# Patient Record
Sex: FEMALE | Race: WHITE | NOT HISPANIC OR LATINO | URBAN - METROPOLITAN AREA
[De-identification: names, ages, dates, MRNs, and addresses within clinical notes are randomized per-mention and may not be internally consistent; named-entity substitution may affect disease eponyms.]

---

## 2020-12-03 ENCOUNTER — WEB ENCOUNTER (OUTPATIENT)
Dept: URBAN - METROPOLITAN AREA CLINIC 94 | Facility: CLINIC | Age: 20
End: 2020-12-03

## 2020-12-03 ENCOUNTER — OFFICE VISIT (OUTPATIENT)
Dept: URBAN - METROPOLITAN AREA CLINIC 94 | Facility: CLINIC | Age: 20
End: 2020-12-03
Payer: SELF-PAY

## 2020-12-03 DIAGNOSIS — R11.2 NAUSEA AND VOMITING: ICD-10-CM

## 2020-12-03 PROCEDURE — G8427 DOCREV CUR MEDS BY ELIG CLIN: HCPCS | Performed by: INTERNAL MEDICINE

## 2020-12-03 PROCEDURE — 99204 OFFICE O/P NEW MOD 45 MIN: CPT | Performed by: INTERNAL MEDICINE

## 2020-12-03 PROCEDURE — G8420 CALC BMI NORM PARAMETERS: HCPCS | Performed by: INTERNAL MEDICINE

## 2020-12-03 PROCEDURE — G9903 PT SCRN TBCO ID AS NON USER: HCPCS | Performed by: INTERNAL MEDICINE

## 2020-12-03 RX ORDER — NORETHINDRONE ACETATE AND ETHINYL ESTRADIOL, ETHINYL ESTRADIOL AND FERROUS FUMARATE 1MG-10(24)
KIT ORAL
Qty: 84 DELAYED RELEASE TABLET | Status: ACTIVE | COMMUNITY

## 2020-12-03 RX ORDER — ONDANSETRON 4 MG/1
TABLET, FILM COATED ORAL
Qty: 12 DELAYED RELEASE TABLET | Status: ACTIVE | COMMUNITY

## 2020-12-03 RX ORDER — AMITRIPTYLINE HYDROCHLORIDE 25 MG/1
1 TABLET AT BEDTIME TABLET, FILM COATED ORAL ONCE A DAY
Qty: 30 TABLET | Refills: 6 | OUTPATIENT

## 2020-12-03 RX ORDER — OMEPRAZOLE 20 MG/1
1 CAPSULE 30 MINUTES BEFORE MORNING MEAL CAPSULE, DELAYED RELEASE ORAL ONCE A DAY
Status: ACTIVE | COMMUNITY

## 2020-12-03 RX ORDER — SUCRALFATE 1 G/1
1 TABLET ON AN EMPTY STOMACH TABLET ORAL
Status: ACTIVE | COMMUNITY

## 2020-12-03 RX ORDER — DICYCLOMINE HYDROCHLORIDE 20 MG/1
TABLET ORAL
Qty: 20 DELAYED RELEASE TABLET | Status: ACTIVE | COMMUNITY

## 2020-12-03 NOTE — HPI-TODAY'S VISIT:
Pt presents for evaluation of chronic nausea, vomiting and abdominal pain. Symptoms present for 1 year. Pt has intermittent episodes of severe nausea with vomiting, once every few months. She throws up to extent that she gets dehydrated and has to go to ER. She gets soreness in abdominal mucles due to vomiting. Inbetween she is fine with no GI issues. No GERD, dyspepsia or weight loss. Pt suffers from migraine headaches. Denies marijuana use. Pt recently evaluated at [facility] [place] with severe N/V. CBC and CMP WNL. CT A/P essesntially negative. Pt started on prilosec, carafate, bentyl and zofran. No episodes since then.

## 2021-01-13 ENCOUNTER — CLAIMS CREATED FROM THE CLAIM WINDOW (OUTPATIENT)
Dept: URBAN - METROPOLITAN AREA CLINIC 4 | Facility: CLINIC | Age: 21
End: 2021-01-13
Payer: SELF-PAY

## 2021-01-13 ENCOUNTER — OFFICE VISIT (OUTPATIENT)
Dept: URBAN - METROPOLITAN AREA SURGERY CENTER 17 | Facility: SURGERY CENTER | Age: 21
End: 2021-01-13
Payer: SELF-PAY

## 2021-01-13 DIAGNOSIS — R11.2 ACUTE NAUSEA WITH NONBILIOUS VOMITING: ICD-10-CM

## 2021-01-13 DIAGNOSIS — R10.13 ABDOMINAL DISCOMFORT, EPIGASTRIC: ICD-10-CM

## 2021-01-13 DIAGNOSIS — K31.89 ACQUIRED DEFORMITY OF DUODENUM: ICD-10-CM

## 2021-01-13 DIAGNOSIS — K31.89 OTHER DISEASES OF STOMACH AND DUODENUM: ICD-10-CM

## 2021-01-13 PROCEDURE — 88342 IMHCHEM/IMCYTCHM 1ST ANTB: CPT | Performed by: PATHOLOGY

## 2021-01-13 PROCEDURE — 88312 SPECIAL STAINS GROUP 1: CPT | Performed by: PATHOLOGY

## 2021-01-13 PROCEDURE — G8907 PT DOC NO EVENTS ON DISCHARG: HCPCS | Performed by: INTERNAL MEDICINE

## 2021-01-13 PROCEDURE — 88305 TISSUE EXAM BY PATHOLOGIST: CPT | Performed by: PATHOLOGY

## 2021-01-13 PROCEDURE — 43239 EGD BIOPSY SINGLE/MULTIPLE: CPT | Performed by: INTERNAL MEDICINE

## 2021-01-13 RX ORDER — OMEPRAZOLE 20 MG/1
1 CAPSULE 30 MINUTES BEFORE MORNING MEAL CAPSULE, DELAYED RELEASE ORAL ONCE A DAY
Status: ACTIVE | COMMUNITY

## 2021-01-13 RX ORDER — SUCRALFATE 1 G/1
1 TABLET ON AN EMPTY STOMACH TABLET ORAL
Status: ACTIVE | COMMUNITY

## 2021-01-13 RX ORDER — AMITRIPTYLINE HYDROCHLORIDE 25 MG/1
1 TABLET AT BEDTIME TABLET, FILM COATED ORAL ONCE A DAY
Qty: 30 TABLET | Refills: 6 | Status: ACTIVE | COMMUNITY

## 2021-01-13 RX ORDER — NORETHINDRONE ACETATE AND ETHINYL ESTRADIOL, ETHINYL ESTRADIOL AND FERROUS FUMARATE 1MG-10(24)
KIT ORAL
Qty: 84 DELAYED RELEASE TABLET | Status: ACTIVE | COMMUNITY

## 2021-01-13 RX ORDER — ONDANSETRON 4 MG/1
TABLET, FILM COATED ORAL
Qty: 12 DELAYED RELEASE TABLET | Status: ACTIVE | COMMUNITY

## 2021-01-13 RX ORDER — DICYCLOMINE HYDROCHLORIDE 20 MG/1
TABLET ORAL
Qty: 20 DELAYED RELEASE TABLET | Status: ACTIVE | COMMUNITY

## 2021-07-20 ENCOUNTER — OFFICE VISIT (OUTPATIENT)
Dept: URBAN - METROPOLITAN AREA CLINIC 94 | Facility: CLINIC | Age: 21
End: 2021-07-20
Payer: SELF-PAY

## 2021-07-20 VITALS
WEIGHT: 104 LBS | HEIGHT: 61 IN | HEART RATE: 84 BPM | SYSTOLIC BLOOD PRESSURE: 120 MMHG | TEMPERATURE: 98.1 F | DIASTOLIC BLOOD PRESSURE: 72 MMHG | BODY MASS INDEX: 19.63 KG/M2

## 2021-07-20 DIAGNOSIS — R11.14 BILIOUS VOMITING WITH NAUSEA: ICD-10-CM

## 2021-07-20 DIAGNOSIS — R10.13 EPIGASTRIC PAIN: ICD-10-CM

## 2021-07-20 DIAGNOSIS — R16.0 HEPATOMEGALY: ICD-10-CM

## 2021-07-20 PROCEDURE — 99213 OFFICE O/P EST LOW 20 MIN: CPT | Performed by: INTERNAL MEDICINE

## 2021-07-20 RX ORDER — OMEPRAZOLE 20 MG/1
1 CAPSULE 30 MINUTES BEFORE MORNING MEAL CAPSULE, DELAYED RELEASE ORAL ONCE A DAY
Status: ON HOLD | COMMUNITY

## 2021-07-20 RX ORDER — NORETHINDRONE ACETATE AND ETHINYL ESTRADIOL, ETHINYL ESTRADIOL AND FERROUS FUMARATE 1MG-10(24)
KIT ORAL
Qty: 84 DELAYED RELEASE TABLET | Status: ACTIVE | COMMUNITY

## 2021-07-20 RX ORDER — AMITRIPTYLINE HYDROCHLORIDE 25 MG/1
1 TABLET AT BEDTIME TABLET, FILM COATED ORAL ONCE A DAY
Qty: 30 TABLET | Refills: 6

## 2021-07-20 RX ORDER — SUCRALFATE 1 G/1
1 TABLET ON AN EMPTY STOMACH TABLET ORAL
Status: ON HOLD | COMMUNITY

## 2021-07-20 RX ORDER — AMITRIPTYLINE HYDROCHLORIDE 25 MG/1
1 TABLET AT BEDTIME TABLET, FILM COATED ORAL ONCE A DAY
Qty: 30 TABLET | Refills: 6 | Status: ACTIVE | COMMUNITY

## 2021-07-20 RX ORDER — ONDANSETRON 4 MG/1
TABLET, FILM COATED ORAL
Qty: 12 DELAYED RELEASE TABLET | Status: ACTIVE | COMMUNITY

## 2021-07-20 RX ORDER — DICYCLOMINE HYDROCHLORIDE 20 MG/1
TABLET ORAL
Qty: 20 DELAYED RELEASE TABLET | Status: ON HOLD | COMMUNITY

## 2021-07-20 NOTE — PHYSICAL EXAM CONSTITUTIONAL:
well developed, well nourished, thin, in no acute distress , ambulating without difficulty , normal communication ability

## 2021-07-20 NOTE — HPI-TODAY'S VISIT:
Ms. Sequeira presents today in follow up from ED visit 6/29 with complaints of abdominal pain and vomiting.  CT performed reveals a slightly enlarged liver (20cm)and periportal edema.  Liver enzymes in normal range.  She reports today that she is no longer taking dicyclomine, omeprazole or sucralfate.  She states that she has been taking amitriptyline as prescribed and as long as she does this, she has no further symptoms.  She reports that she realized stress and anxiety was a strong contributing factors to her symptoms.  She states she has also stopped vaping which as also helped.   EGD was last performed in January with normal findings.  She has since gained 9 pounds. She takes an occasional Zofran if needed.

## 2021-07-21 ENCOUNTER — WEB ENCOUNTER (OUTPATIENT)
Dept: URBAN - METROPOLITAN AREA CLINIC 94 | Facility: CLINIC | Age: 21
End: 2021-07-21

## 2021-07-21 LAB
HBSAG SCREEN: NEGATIVE
HEP A AB, IGM: NEGATIVE
HEP B CORE AB, IGM: NEGATIVE
HEP C VIRUS AB: <0.1

## 2021-07-26 ENCOUNTER — TELEPHONE ENCOUNTER (OUTPATIENT)
Dept: URBAN - METROPOLITAN AREA CLINIC 94 | Facility: CLINIC | Age: 21
End: 2021-07-26

## 2021-08-20 ENCOUNTER — ERX REFILL RESPONSE (OUTPATIENT)
Dept: URBAN - METROPOLITAN AREA CLINIC 94 | Facility: CLINIC | Age: 21
End: 2021-08-20

## 2021-08-20 RX ORDER — AMITRIPTYLINE HYDROCHLORIDE 25 MG/1
TAKE 1 TABLET BY MOUTH AT BEDTIME DAILY TABLET, FILM COATED ORAL
Qty: 90 TABLET | Refills: 3 | OUTPATIENT

## 2021-08-20 RX ORDER — AMITRIPTYLINE HYDROCHLORIDE 25 MG/1
1 TABLET AT BEDTIME TABLET, FILM COATED ORAL ONCE A DAY
Qty: 30 TABLET | Refills: 6 | OUTPATIENT

## 2021-10-21 ENCOUNTER — ERX REFILL RESPONSE (OUTPATIENT)
Dept: URBAN - METROPOLITAN AREA CLINIC 94 | Facility: CLINIC | Age: 21
End: 2021-10-21

## 2021-10-21 RX ORDER — AMITRIPTYLINE HYDROCHLORIDE 25 MG/1
TAKE 1 TABLET BY MOUTH AT BEDTIME TABLET, FILM COATED ORAL
Qty: 90 TABLET | Refills: 4 | OUTPATIENT

## 2021-10-21 RX ORDER — AMITRIPTYLINE HYDROCHLORIDE 25 MG/1
TAKE 1 TABLET BY MOUTH AT BEDTIME DAILY TABLET, FILM COATED ORAL
Qty: 90 TABLET | Refills: 3 | OUTPATIENT

## 2022-01-18 ENCOUNTER — OFFICE VISIT (OUTPATIENT)
Dept: URBAN - METROPOLITAN AREA CLINIC 94 | Facility: CLINIC | Age: 22
End: 2022-01-18

## 2022-01-18 RX ORDER — AMITRIPTYLINE HYDROCHLORIDE 25 MG/1
TAKE 1 TABLET BY MOUTH AT BEDTIME TABLET, FILM COATED ORAL
Qty: 90 TABLET | Refills: 4 | COMMUNITY

## 2022-01-18 RX ORDER — OMEPRAZOLE 20 MG/1
1 CAPSULE 30 MINUTES BEFORE MORNING MEAL CAPSULE, DELAYED RELEASE ORAL ONCE A DAY
COMMUNITY

## 2022-01-18 RX ORDER — ONDANSETRON 4 MG/1
TABLET, FILM COATED ORAL
Qty: 12 DELAYED RELEASE TABLET | COMMUNITY

## 2022-01-18 RX ORDER — NORETHINDRONE ACETATE AND ETHINYL ESTRADIOL, ETHINYL ESTRADIOL AND FERROUS FUMARATE 1MG-10(24)
KIT ORAL
Qty: 84 DELAYED RELEASE TABLET | COMMUNITY

## 2022-01-18 RX ORDER — DICYCLOMINE HYDROCHLORIDE 20 MG/1
TABLET ORAL
Qty: 20 DELAYED RELEASE TABLET | COMMUNITY

## 2022-01-18 RX ORDER — SUCRALFATE 1 G/1
1 TABLET ON AN EMPTY STOMACH TABLET ORAL
COMMUNITY

## 2022-01-26 ENCOUNTER — LAB OUTSIDE AN ENCOUNTER (OUTPATIENT)
Dept: URBAN - METROPOLITAN AREA CLINIC 94 | Facility: CLINIC | Age: 22
End: 2022-01-26

## 2022-01-26 ENCOUNTER — CLAIMS CREATED FROM THE CLAIM WINDOW (OUTPATIENT)
Dept: URBAN - METROPOLITAN AREA CLINIC 94 | Facility: CLINIC | Age: 22
End: 2022-01-26
Payer: SELF-PAY

## 2022-01-26 ENCOUNTER — WEB ENCOUNTER (OUTPATIENT)
Dept: URBAN - METROPOLITAN AREA CLINIC 94 | Facility: CLINIC | Age: 22
End: 2022-01-26

## 2022-01-26 ENCOUNTER — DASHBOARD ENCOUNTERS (OUTPATIENT)
Age: 22
End: 2022-01-26

## 2022-01-26 VITALS
HEIGHT: 61 IN | TEMPERATURE: 98.4 F | SYSTOLIC BLOOD PRESSURE: 115 MMHG | DIASTOLIC BLOOD PRESSURE: 71 MMHG | WEIGHT: 99 LBS | BODY MASS INDEX: 18.69 KG/M2 | HEART RATE: 88 BPM

## 2022-01-26 DIAGNOSIS — R63.0 LOSS OF APPETITE FOR MORE THAN 2 WEEKS: ICD-10-CM

## 2022-01-26 DIAGNOSIS — R10.9 ABDOMINAL CRAMPING: ICD-10-CM

## 2022-01-26 DIAGNOSIS — R16.0 HEPATOMEGALY: ICD-10-CM

## 2022-01-26 DIAGNOSIS — R10.13 ABDOMINAL DISCOMFORT, EPIGASTRIC: ICD-10-CM

## 2022-01-26 DIAGNOSIS — R11.14 BILIOUS VOMITING WITH NAUSEA: ICD-10-CM

## 2022-01-26 PROCEDURE — 99214 OFFICE O/P EST MOD 30 MIN: CPT | Performed by: INTERNAL MEDICINE

## 2022-01-26 PROCEDURE — 99214 OFFICE O/P EST MOD 30 MIN: CPT | Performed by: NURSE PRACTITIONER

## 2022-01-26 RX ORDER — DICYCLOMINE HYDROCHLORIDE 20 MG/1
1 TABLET TABLET ORAL THREE TIMES A DAY
Qty: 90 TABLET | Refills: 1

## 2022-01-26 RX ORDER — AMITRIPTYLINE HYDROCHLORIDE 25 MG/1
TAKE 1 TABLET BY MOUTH AT BEDTIME TABLET, FILM COATED ORAL
OUTPATIENT

## 2022-01-26 RX ORDER — ONDANSETRON 4 MG/1
TABLET, FILM COATED ORAL
Qty: 12 DELAYED RELEASE TABLET | Status: ON HOLD | COMMUNITY

## 2022-01-26 RX ORDER — AMITRIPTYLINE HYDROCHLORIDE 50 MG/1
1 TABLET AT BEDTIME TABLET, FILM COATED ORAL ONCE A DAY
Qty: 30 TABLETS | Refills: 3 | OUTPATIENT
Start: 2022-01-30

## 2022-01-26 RX ORDER — DICYCLOMINE HYDROCHLORIDE 20 MG/1
TABLET ORAL
Qty: 20 DELAYED RELEASE TABLET | Status: ON HOLD | COMMUNITY

## 2022-01-26 RX ORDER — NORETHINDRONE ACETATE AND ETHINYL ESTRADIOL, ETHINYL ESTRADIOL AND FERROUS FUMARATE 1MG-10(24)
KIT ORAL
Qty: 84 DELAYED RELEASE TABLET | Status: ON HOLD | COMMUNITY

## 2022-01-26 RX ORDER — OMEPRAZOLE 20 MG/1
1 CAPSULE 30 MINUTES BEFORE MORNING MEAL CAPSULE, DELAYED RELEASE ORAL ONCE A DAY
Status: ON HOLD | COMMUNITY

## 2022-01-26 RX ORDER — AMITRIPTYLINE HYDROCHLORIDE 25 MG/1
TAKE 1 TABLET BY MOUTH AT BEDTIME TABLET, FILM COATED ORAL
Qty: 90 TABLET | Refills: 4 | Status: ACTIVE | COMMUNITY

## 2022-01-26 RX ORDER — SUCRALFATE 1 G/1
1 TABLET ON AN EMPTY STOMACH TABLET ORAL
COMMUNITY

## 2022-01-26 RX ORDER — ONDANSETRON HYDROCHLORIDE 8 MG/1
1 TABLET AS NEEDED TABLET, FILM COATED ORAL THREE TIMES A DAY
Qty: 60 | Refills: 1

## 2022-01-26 NOTE — PHYSICAL EXAM CONSTITUTIONAL:
well developed, well nourished , in no acute distress , ambulating without difficulty , normal communication ability, anxious and tearful

## 2022-01-26 NOTE — HPI-TODAY'S VISIT:
Ms. Sequeira presents today for a scheduled follow up visit and is accompanied by her boyfriend.  She is anxious and in tears stating she "cannot gain weight, cannot get above 100 pounds.  She has stopped all medications saying that they do not help. She just does not have an appetite.  She along with her boyfriend report that periodically while brushing her teeth, she begins gagging and then gags until she vomits to the point of dry heaves.  She has been seen in the ED in the past with CTs with essentially normal findings.  Past EGD with normal findings.  She states she has never had thyroid labs.  Will check, however likely cyclic vomiting syndrome.  She is encouraged to restart amitriptyline and to use ondansetron as needed.    PREVIOUS VISIT NOTE:7/20/21 Ms. Sequeira presents today in follow up from ED visit 6/29 with complaints of abdominal pain and vomiting.  CT performed reveals a slightly enlarged liver (20cm)and periportal edema.  Liver enzymes in normal range.  She reports today that she is no longer taking dicyclomine, omeprazole or sucralfate.  She states that she has been taking amitriptyline as prescribed and as long as she does this, she has no further symptoms.  She reports that she realized stress and anxiety was a strong contributing factors to her symptoms.  She states she has also stopped vaping which as also helped.   EGD was last performed in January with normal findings.  She has since gained 9 pounds. She takes an occasional Zofran if needed.    PREVIOUS VISIT 12/20/20  Pt presents for evaluation of chronic nausea, vomiting and abdominal pain. Symptoms present for 1 year. Pt has intermittent episodes of severe nausea with vomiting, once every few months. She throws up to extent that she gets dehydrated and has to go to ER. She gets soreness in abdominal muscles due to vomiting. In between she is fine with no GI issues. No GERD, dyspepsia or weight loss. Pt suffers from migraine headaches. Denies marijuana use. Pt recently evaluated at [facility] with severe N/V. CBC and CMP WNL. CT A/P essesntially negative. Pt started on prilosec, carafate, bentyl and zofran. No episodes since then.

## 2022-01-31 PROBLEM — 79890006: Status: ACTIVE | Noted: 2022-01-26

## 2022-02-18 ENCOUNTER — ERX REFILL RESPONSE (OUTPATIENT)
Dept: URBAN - METROPOLITAN AREA CLINIC 94 | Facility: CLINIC | Age: 22
End: 2022-02-18

## 2022-02-18 RX ORDER — AMITRIPTYLINE HYDROCHLORIDE 50 MG/1
1 TABLET AT BEDTIME TABLET, FILM COATED ORAL ONCE A DAY
Qty: 30 TABLETS | Refills: 3 | OUTPATIENT

## 2022-02-18 RX ORDER — AMITRIPTYLINE HYDROCHLORIDE 50 MG/1
TAKE 1 TABLET BY MOUTH EVERY DAY AT BEDTIME FOR 30 DAYS TABLET, FILM COATED ORAL
Qty: 90 TABLET | Refills: 2 | OUTPATIENT

## 2022-02-18 RX ORDER — DICYCLOMINE HYDROCHLORIDE 20 MG/1
TAKE 1 TABLET BY MOUTH 3 TIMES A DAY 30 DAYS TABLET ORAL
Qty: 270 TABLET | Refills: 1 | OUTPATIENT